# Patient Record
Sex: MALE | Race: WHITE | Employment: OTHER | URBAN - METROPOLITAN AREA
[De-identification: names, ages, dates, MRNs, and addresses within clinical notes are randomized per-mention and may not be internally consistent; named-entity substitution may affect disease eponyms.]

---

## 2021-09-10 ENCOUNTER — HOSPITAL ENCOUNTER (OUTPATIENT)
Age: 51
Discharge: HOME | End: 2021-09-10
Payer: COMMERCIAL

## 2021-09-10 LAB
PARATHYROID HORMONE INTACT: 92.4 PG/ML (ref 14–72)
VITAMIN D 25-HYDROXY: 32.8 NG/ML

## 2021-09-10 PROCEDURE — 82306 VITAMIN D 25 HYDROXY: CPT

## 2021-09-10 PROCEDURE — 83970 ASSAY OF PARATHORMONE: CPT

## 2021-09-10 PROCEDURE — 36415 COLL VENOUS BLD VENIPUNCTURE: CPT

## 2021-11-16 ENCOUNTER — OFFICE VISIT (OUTPATIENT)
Dept: ENDOCRINOLOGY | Age: 51
End: 2021-11-16
Payer: COMMERCIAL

## 2021-11-16 VITALS
HEART RATE: 95 BPM | BODY MASS INDEX: 27.29 KG/M2 | DIASTOLIC BLOOD PRESSURE: 84 MMHG | WEIGHT: 201.2 LBS | SYSTOLIC BLOOD PRESSURE: 120 MMHG

## 2021-11-16 DIAGNOSIS — K21.9 GASTROESOPHAGEAL REFLUX DISEASE, UNSPECIFIED WHETHER ESOPHAGITIS PRESENT: ICD-10-CM

## 2021-11-16 DIAGNOSIS — J45.20 MILD INTERMITTENT ASTHMA WITHOUT COMPLICATION: ICD-10-CM

## 2021-11-16 DIAGNOSIS — E21.3 HYPERPARATHYROIDISM (HCC): Primary | ICD-10-CM

## 2021-11-16 DIAGNOSIS — J30.2 SEASONAL ALLERGIES: ICD-10-CM

## 2021-11-16 PROCEDURE — G8419 CALC BMI OUT NRM PARAM NOF/U: HCPCS | Performed by: INTERNAL MEDICINE

## 2021-11-16 PROCEDURE — G8484 FLU IMMUNIZE NO ADMIN: HCPCS | Performed by: INTERNAL MEDICINE

## 2021-11-16 PROCEDURE — G8427 DOCREV CUR MEDS BY ELIG CLIN: HCPCS | Performed by: INTERNAL MEDICINE

## 2021-11-16 PROCEDURE — 99243 OFF/OP CNSLTJ NEW/EST LOW 30: CPT | Performed by: INTERNAL MEDICINE

## 2021-11-16 NOTE — PROGRESS NOTES
Karolyn Pack is referred to me by Jeanette Worley for Hypercalcemia noted on routine labs. Valencia Horta was diagnosed with hypercalcemia at his routine screening in 2021. Patient doesn't have any history of kidney stones he denies any hematuria . In  had normal calcium 9.3. In 2021 he had PTH level of 92 and Vit D 32 . Patient feels most of his symptoms are due to hypercalcemia and would be interested in surgery. Gerardo Montes complains of   nonspecific symptoms, such as constipation, fatigue, and depression often associated with hypercalcemia . Pt also c/o muscle weakness, and changes in sensorium. He does feel he has cognitive dysfunction more prominent recently. Patient denies any family history of hypercalcemia or MEN syndrome. He has severe constipation for years bit it has gotten worse in the past few years. He has GERD which has been severe and he has been taking otc medicine, he does not recall the names but he also occasionally takes Tums he was advised to stop taking Tums. Patient does have asthma and is on inhalers. Chief Complaint   Patient presents with    New Patient    Thyroid Problem     hyperparathyroidism         Past Medical History:   Diagnosis Date    Allergic rhinitis     Asthma     Hay fever          Past Surgical History:   Procedure Laterality Date    TONSILLECTOMY      WRIST SURGERY         History reviewed. No pertinent family history.     Social History     Socioeconomic History    Marital status: Single     Spouse name: Not on file    Number of children: Not on file    Years of education: Not on file    Highest education level: Not on file   Occupational History    Not on file   Tobacco Use    Smoking status: Former Smoker     Quit date: 2012     Years since quittin.4    Smokeless tobacco: Never Used   Substance and Sexual Activity    Alcohol use: No     Comment: occasionally     Drug use: Not on file    Sexual activity: Not on file   Other Topics Concern    Not on file   Social History Narrative    Not on file     Social Determinants of Health     Financial Resource Strain:     Difficulty of Paying Living Expenses: Not on file   Food Insecurity:     Worried About Running Out of Food in the Last Year: Not on file    Alisha of Food in the Last Year: Not on file   Transportation Needs:     Lack of Transportation (Medical): Not on file    Lack of Transportation (Non-Medical):  Not on file   Physical Activity:     Days of Exercise per Week: Not on file    Minutes of Exercise per Session: Not on file   Stress:     Feeling of Stress : Not on file   Social Connections:     Frequency of Communication with Friends and Family: Not on file    Frequency of Social Gatherings with Friends and Family: Not on file    Attends Bahai Services: Not on file    Active Member of [address] [place] [address] [employer] or Organizations: Not on file    Attends Club or Organization Meetings: Not on file    Marital Status: Not on file   Intimate Partner Violence:     Fear of Current or Ex-Partner: Not on file    Emotionally Abused: Not on file    Physically Abused: Not on file    Sexually Abused: Not on file   Housing Stability:     Unable to Pay for Housing in the Last Year: Not on file    Number of [place] in the Last Year: Not on file    Unstable Housing in the Last Year: Not on file       /84   Pulse 95   Wt 201 lb 3.2 oz (91.3 kg)   BMI 27.29 kg/m²       Chemistry    No results found for: NA, K, CL, CO2, BUN, CREATININE No results found for: CALCIUM, ALKPHOS, AST, ALT, BILITOT            PHYSICAL EXAM   Vitals:    11/16/21 0844   BP: 120/84   Pulse: 95       Constitutional: no acute distress, well appearing and well nourished  Psychiatric: oriented to person, place and time, judgement and insight and normal, recent and remote memory and intact and mood and affect are normal  Skin: skin and subcutaneous tissue and normal without rashes or lesions and palpation of skin and subcutaneous tissue is normal without mass, normal turgor  Head and Face: examination of head and face revealed no abnormalities  Eyes: no lid or conjunctival swelling, erythema or discharge and pupils are normal,   Ears/Nose: external inspection of ears and nose revealed no abnormalities and hearing is grossly normal  Oropharynx/Mouth/Face: lips, tongue and gums are normal with no lesions and the voice quality was normal  Neck: neck is supple and symmetric, with midline trachea and no masses and thyroid is normal with no enlargement or masses  Lymphatics: normal cervical lymph nodes, normal supraclavicular nodes  Pulmonary: no increased work of breathing or signs of respiratory distress and lungs are clear to auscultation  Cardiovascular: normal heart rate and rhythm, normal S1 and S2,   Musculoskeletal: normal gait and station   Neurological Coordination: normal coordination and General Cortical Function: normal    ROS   I have reviewed the review of system questionnaire filled by the patient . Patient was advised to contact PCP for non endocrine signs and symptoms       ASSESSMENT/PLAN     1. Hyperparathyroidism [facility])  Patient is noted to have a vitamin D of 32 in September 2021 with a concomitant PTH level of 92.4, there was no concomitant calcium done with this. I am awaiting the previous calcium levels which were done at the primary care office but the results were not sent to me. I advised patient to avoid factors that can aggravate hypercalcemia including thiazide diuretic and lithium carbonate therapy, volume depletion, prolonged bed rest or inactivity, and a high-calcium diet (>1000 mg/day). ?Encourage physical activity to minimize bone resorption. ? Encourage adequate hydration (at least six to eight glasses of water per day) to minimize the risk of nephrolithiasis. ?Maintain a moderate calcium intake (1000 mg/day)  ? Maintain moderate vitamin D intake (400 to 800 international units daily) to maintain a serum 25-hydroxyvitamin D (25[OH]D) level of at least 20 or 30 ng/mL (50 or 75 mmol/L). Vitamin D deficiency stimulates PTH secretion and bone resorption and, therefore, is deleterious in patients with PHPT. I have ordered the following labs patient will call back once he gets these and if he has a localizing parathyroid adenoma he will be referred for surgery otherwise we will medically manage him. Patient is inclined towards getting surgery done. - PTH, Intact; Future  - Phosphorus; Future  - Vitamin D 25 Hydroxy; Future  - Vitamin D 1,25 Dihydroxy; Future  - DEXA BONE DENSITY AXIAL SKELETON; Future  - Calcium, 24 HR Urine; Future  - Comprehensive Metabolic Panel; Future  - NM PARATHYROID SCAN; Future  - TSH without Reflex; Future  - T4, Free; Future    2. Mild intermittent asthma without complication  Patient is on inhalers stable    3. Seasonal allergies  Stable    4. Gastroesophageal reflux disease, unspecified whether esophagitis present  Patient takes over-the-counter medication, he cannot recall the name of it  He also takes occasionally Tums he was advised to avoid that while we are doing work-up for hypercalcemia he can take medications like Zantac or Nexium. Please note that some or all of this report was generated using voice recognition software. Please notify me in case of any questions about the content of this document, as some errors in transcription may have occurred .

## 2021-11-17 ENCOUNTER — TELEPHONE (OUTPATIENT)
Dept: ENDOCRINOLOGY | Age: 51
End: 2021-11-17

## 2021-11-17 NOTE — TELEPHONE ENCOUNTER
Fax from [facility] Source of [address] [place] [address] Dr regarding the office note from 8/17/21

## 2021-11-17 NOTE — TELEPHONE ENCOUNTER
Called [address]  to have the office refax the pt's most recent labs    Waiting for the labs to be faxed

## 2021-11-18 ENCOUNTER — HOSPITAL ENCOUNTER (OUTPATIENT)
Age: 51
Discharge: HOME | End: 2021-11-18
Payer: COMMERCIAL

## 2021-11-18 DIAGNOSIS — E21.3 HYPERPARATHYROIDISM (HCC): ICD-10-CM

## 2021-11-18 LAB
24HR URINE VOLUME (ML): 2200 ML
CALCIUM 24 HOUR URINE: 548 MG/24 HR (ref 42–353)
CREATININE 24 HOUR URINE: 2 G/24HR (ref 0.6–2.5)

## 2021-11-18 PROCEDURE — 82340 ASSAY OF CALCIUM IN URINE: CPT

## 2021-11-26 ENCOUNTER — HOSPITAL ENCOUNTER (OUTPATIENT)
Dept: GENERAL RADIOLOGY | Age: 51
Discharge: HOME | End: 2021-11-26
Payer: COMMERCIAL

## 2021-11-26 ENCOUNTER — HOSPITAL ENCOUNTER (OUTPATIENT)
Dept: NUCLEAR MEDICINE | Age: 51
Discharge: HOME | End: 2021-11-26
Payer: COMMERCIAL

## 2021-11-26 ENCOUNTER — HOSPITAL ENCOUNTER (OUTPATIENT)
Age: 51
Discharge: HOME | End: 2021-11-26
Payer: COMMERCIAL

## 2021-11-26 DIAGNOSIS — E21.3 HYPERPARATHYROIDISM (HCC): ICD-10-CM

## 2021-11-26 PROCEDURE — 77080 DXA BONE DENSITY AXIAL: CPT

## 2021-11-26 PROCEDURE — 3430000000 HC RX DIAGNOSTIC RADIOPHARMACEUTICAL: Performed by: INTERNAL MEDICINE

## 2021-11-26 PROCEDURE — 78072 PARATHYRD PLANAR W/SPECT&CT: CPT

## 2021-11-26 PROCEDURE — A9500 TC99M SESTAMIBI: HCPCS | Performed by: INTERNAL MEDICINE

## 2021-11-26 RX ADMIN — Medication 27 MILLICURIE: at 10:50

## 2021-12-02 ENCOUNTER — HOSPITAL ENCOUNTER (OUTPATIENT)
Age: 51
Discharge: HOME | End: 2021-12-02
Payer: COMMERCIAL

## 2021-12-02 DIAGNOSIS — E21.3 HYPERPARATHYROIDISM (HCC): ICD-10-CM

## 2021-12-02 LAB
A/G RATIO: 1.4 (ref 1.1–2.2)
ALBUMIN SERPL-MCNC: 4.4 G/DL (ref 3.4–5)
ALP BLD-CCNC: 58 U/L (ref 40–129)
ALT SERPL-CCNC: 58 U/L (ref 10–40)
ANION GAP SERPL CALCULATED.3IONS-SCNC: 14 MMOL/L (ref 3–16)
AST SERPL-CCNC: 36 U/L (ref 15–37)
BILIRUB SERPL-MCNC: 0.4 MG/DL (ref 0–1)
BUN BLDV-MCNC: 13 MG/DL (ref 7–20)
CALCIUM SERPL-MCNC: 10.9 MG/DL (ref 8.3–10.6)
CHLORIDE BLD-SCNC: 101 MMOL/L (ref 99–110)
CO2: 24 MMOL/L (ref 21–32)
CREAT SERPL-MCNC: 1.1 MG/DL (ref 0.9–1.3)
GFR AFRICAN AMERICAN: >60
GFR NON-AFRICAN AMERICAN: >60
GLUCOSE BLD-MCNC: 92 MG/DL (ref 70–99)
PARATHYROID HORMONE INTACT: 89.5 PG/ML (ref 14–72)
PHOSPHORUS: 2.5 MG/DL (ref 2.5–4.9)
POTASSIUM SERPL-SCNC: 4.6 MMOL/L (ref 3.5–5.1)
SODIUM BLD-SCNC: 139 MMOL/L (ref 136–145)
T4 FREE: 1.1 NG/DL (ref 0.9–1.8)
TOTAL PROTEIN: 7.6 G/DL (ref 6.4–8.2)
TSH SERPL DL<=0.05 MIU/L-ACNC: 2.5 UIU/ML (ref 0.27–4.2)
VITAMIN D 25-HYDROXY: 34.4 NG/ML

## 2021-12-02 PROCEDURE — 84100 ASSAY OF PHOSPHORUS: CPT

## 2021-12-02 PROCEDURE — 83970 ASSAY OF PARATHORMONE: CPT

## 2021-12-02 PROCEDURE — 84443 ASSAY THYROID STIM HORMONE: CPT

## 2021-12-02 PROCEDURE — 84439 ASSAY OF FREE THYROXINE: CPT

## 2021-12-02 PROCEDURE — 82652 VIT D 1 25-DIHYDROXY: CPT

## 2021-12-02 PROCEDURE — 80053 COMPREHEN METABOLIC PANEL: CPT

## 2021-12-02 PROCEDURE — 36415 COLL VENOUS BLD VENIPUNCTURE: CPT

## 2021-12-02 PROCEDURE — 82306 VITAMIN D 25 HYDROXY: CPT

## 2021-12-05 LAB — VITAMIN D 1,25-DIHYDROXY: 68.8 PG/ML (ref 19.9–79.3)

## 2021-12-08 DIAGNOSIS — E21.3 HYPERPARATHYROIDISM (HCC): Primary | ICD-10-CM

## 2021-12-13 ENCOUNTER — HOSPITAL ENCOUNTER (OUTPATIENT)
Dept: ULTRASOUND IMAGING | Age: 51
Discharge: HOME | End: 2021-12-13
Payer: COMMERCIAL

## 2021-12-13 ENCOUNTER — OFFICE VISIT (OUTPATIENT)
Dept: ENT CLINIC | Age: 51
End: 2021-12-13
Payer: COMMERCIAL

## 2021-12-13 VITALS — TEMPERATURE: 97.5 F | HEART RATE: 77 BPM | SYSTOLIC BLOOD PRESSURE: 130 MMHG | DIASTOLIC BLOOD PRESSURE: 91 MMHG

## 2021-12-13 DIAGNOSIS — H90.11 CONDUCTIVE HEARING LOSS OF RIGHT EAR WITH UNRESTRICTED HEARING OF LEFT EAR: ICD-10-CM

## 2021-12-13 DIAGNOSIS — E21.0 HYPERPARATHYROIDISM, PRIMARY (HCC): ICD-10-CM

## 2021-12-13 DIAGNOSIS — D35.1 PARATHYROID ADENOMA: ICD-10-CM

## 2021-12-13 DIAGNOSIS — E21.0 HYPERPARATHYROIDISM, PRIMARY (HCC): Primary | ICD-10-CM

## 2021-12-13 PROCEDURE — G8484 FLU IMMUNIZE NO ADMIN: HCPCS | Performed by: OTOLARYNGOLOGY

## 2021-12-13 PROCEDURE — 99203 OFFICE O/P NEW LOW 30 MIN: CPT | Performed by: OTOLARYNGOLOGY

## 2021-12-13 PROCEDURE — G8427 DOCREV CUR MEDS BY ELIG CLIN: HCPCS | Performed by: OTOLARYNGOLOGY

## 2021-12-13 PROCEDURE — 76536 US EXAM OF HEAD AND NECK: CPT

## 2021-12-13 PROCEDURE — 1036F TOBACCO NON-USER: CPT | Performed by: OTOLARYNGOLOGY

## 2021-12-13 PROCEDURE — 3017F COLORECTAL CA SCREEN DOC REV: CPT | Performed by: OTOLARYNGOLOGY

## 2021-12-13 PROCEDURE — G8419 CALC BMI OUT NRM PARAM NOF/U: HCPCS | Performed by: OTOLARYNGOLOGY

## 2021-12-13 ASSESSMENT — ENCOUNTER SYMPTOMS
RHINORRHEA: 0
VOICE CHANGE: 0
SINUS PAIN: 0
TROUBLE SWALLOWING: 0
SORE THROAT: 0

## 2021-12-13 NOTE — PROGRESS NOTES
[facility]       NEW PATIENT VISIT      PCP:  NEIL Mooney - AR[facility]ELLA      REFERRED BY:   Dr. Jessica Angela  Chief Complaint   Patient presents with    Other     hyperparathyroidism       HISTORY OF PRESENT ILLNESS       Eliane Sanchez is a 46 y.o. male here for evaluation and treatment of hyperparathyroidism. recent sestamibi scan suggested a bilateral parathyroid adenoma. Patient has had hypercalcemia and elevated intact PTH level. Symptoms fatigue, can't think straight insomnia, excessive urination, dehydration. Major heartburn. REVIEW OF SYSTEMS   Review of Systems   Constitutional: Negative for chills, fever and unexpected weight change. HENT: Positive for congestion (off and on due to allergies). Negative for ear discharge, ear pain, hearing loss, rhinorrhea, sinus pain, sore throat, tinnitus, trouble swallowing and voice change. PAST MEDICAL HISTORY    Past Medical History:   Diagnosis Date    Allergic rhinitis     Asthma     Hay fever        Past Surgical History:   Procedure Laterality Date    PARATHYROID GLAND SURGERY Left 12/28/2021    EXCISION OF PARATHYROID ADENOMA  LEFT WITH FROZEN SECTIONS performed by Tonya Woodall MD at [address]:    12/13/21 1258   BP: (!) 130/91   Site: Left Upper Arm   Position: Sitting   Cuff Size: Medium Adult   Pulse: 77   Temp: 97.5 °F (36.4 °C)   TempSrc: Temporal       Physical Exam  Vitals reviewed. Constitutional:       General: He is awake. He is not in acute distress. Appearance: Normal appearance. He is well-developed. He is not ill-appearing or toxic-appearing. HENT:      Head: Normocephalic and atraumatic. Salivary Glands: Right salivary gland is not diffusely enlarged or tender. Left salivary gland is not diffusely enlarged or tender.       Right Ear: Tympanic membrane, ear canal and external ear normal.      Left Ear: Tympanic membrane, ear canal and external ear normal.      Nose: Nose normal. No nasal deformity, septal deviation, mucosal edema, congestion or rhinorrhea. Right Turbinates: Not enlarged. Left Turbinates: Not enlarged. Right Sinus: No maxillary sinus tenderness or frontal sinus tenderness. Left Sinus: No maxillary sinus tenderness or frontal sinus tenderness. Mouth/Throat:      Lips: Pink. No lesions. Mouth: Mucous membranes are moist. No oral lesions. Tongue: No lesions. Palate: No mass and lesions. Pharynx: Oropharynx is clear. Uvula midline. No oropharyngeal exudate or posterior oropharyngeal erythema. Tonsils: No tonsillar exudate or tonsillar abscesses. Neck:      Thyroid: No thyroid mass, thyromegaly or thyroid tenderness. Trachea: No tracheal deviation. Comments: No cervical masses or tenderness. Musculoskeletal:      Cervical back: Normal range of motion and neck supple. Lymphadenopathy:      Cervical: No cervical adenopathy. Neurological:      Mental Status: He is alert. COUNSELING FOR PARATHYROIDECTOMY:    Елена Wilson was advised of the recommended surgery/procedure of exploration and possible excision of parathyroid adenoma, if located. I discussed intraoperative parathyroid hormone level monitoring. Елена Wilson understands this will be done under general anesthesia. The surgical procedure was described. I discussed the incisional scar and possible resultant deformity. I discussed the surgical technique and the usual post operative course. I discussed the possibility of persistent or recurrent hyper parathyroidism and/or hypercalcemia. I advised there is no guarantee of cure, success, or of final results.   I discussed the alternatives of therapy, expected outcome and potential benefits, and the attendant risks and potential complications, including, but not limited to, excessive intraoperative or postoperative bleeding, hemorrhage, infection, numbness of skin, vocal cord paralysis, permanent hoarseness or change in or loss of voice, airway obstruction, injury to surrounding structures or organs, injury to major blood vessels or nerves, excessive scarring, deformity, poor (incomplete or lack of) wound healing, pain, discomfort,  recurrence of cancer, need for further surgery, and risks of anesthesia, including heart attack, cardiac arrest, stroke, blood clots in lower extremity veins, pulmonary embolism, and death. Mesfin Cerna was counseled about the risks of edi COVID-19 during the perioperative period and any recovery window from the procedure. The patient was made aware that edi COVID-19 may worsen the prognosis for recovering from the procedure and lend to a higher morbidity and/or mortality risk. All material risks, benefits, and reasonable alternatives including postponing the procedure were discussed. All of Satish's questions were answered. Mesfin Cerna then stated, expressed, and confirmed understanding and acceptance of the preceding, lack of further questions and the desire to proceed with surgery. REVIEW OF IMAGING         Narrative   EXAMINATION:   NUCLEAR MEDICINE PARATHYROID SCINTIGRAPHY WITH SPECT/CT.  11/26/2021     FINDINGS:   Immediate images:       Activity is seen within bilateral thyroid lobes.  Physiologic activity is   seen within salivary glands.       Delayed images:       There is incomplete washout of activity from bilateral thyroid lobes.  Focal   nodular retention of activity is seen at the inferior thyroid beds   bilaterally, best appreciated on SPECT MIP and fused images.  This is subtly   seen at the inferior left thyroid lobe on planar imaging.  Physiologic   activity is seen within salivary glands and heart.       Scattered ground-glass opacity bilaterally, likely atelectasis.  Calcified   anterior right upper lobe pulmonary nodule, compatible with granuloma. Calcified lymph node at the right hilum, in keeping with granulomatous   disease.           Impression   There is bilateral nodular retention of activity at the inferior thyroid bed   bilaterally for which parathyroid adenomas are possible.  Confirmation with   ultrasound would be recommended prior to any planned surgical intervention.             IMPRESSION / Lisa Moon / Santos Lockhart was seen today for other. Diagnoses and all orders for this visit:    Hyperparathyroidism, primary ([address]  -     US THYROID    Parathyroid adenoma  -     US THYROID    Conductive hearing loss of right ear with unrestricted hearing of left ear         RECOMMENDATIONS/PLAN      1. Thyroid ultrasound to evaluate for parathyroid adenoma, call patient with findings. 2. Exploration and possible excision of bilateral parathyroid adenoma(s). 3. Return for post op check.

## 2021-12-23 RX ORDER — BUDESONIDE AND FORMOTEROL FUMARATE DIHYDRATE 160; 4.5 UG/1; UG/1
2 AEROSOL RESPIRATORY (INHALATION) 2 TIMES DAILY
COMMUNITY

## 2021-12-23 NOTE — PROGRESS NOTES
Name_______________________________________Printed:____________________  Date and time of surgery____12/28/2021  1045____________________Arrival Time:____0915   [facility]____________   1. The instructions given regarding when and if a patient needs to stop oral intake prior to surgery varies. Follow the specific instructions you were given                  _X__Nothing to eat or to drink after Midnight the night before.                   ____Carbo loading or ERAS instructions will be given to select patients-if you have been given those instructions -please do the following                           The evening before your surgery after dinner before midnight drink 40 ounces of gatorade. If you are diabetic use sugar free. The morning of surgery drink 40 ounces of water. This needs to be finished 3 hours prior to your surgery start time. 2. Take the following pills with a small sip of water on the morning of surgery____USE YOUR INHALER AND BRING WITH YOU_______________________________________________                  Do not take blood pressure medications ending in pril or sartan the haile prior to surgery or the morning of surgery_   3. Aspirin, Ibuprofen, Advil, Naproxen, Vitamin E and other Anti-inflammatory products and supplements should be stopped for 5 -7days before surgery or as directed by your physician. 4. Check with your Doctor regarding stopping Plavix, Coumadin,Eliquis, Lovenox,Effient,Pradaxa,Xarelto, Fragmin or other blood thinners and follow their instructions. 5. Do not smoke, and do not drink any alcoholic beverages 24 hours prior to surgery. This includes NA Beer. Refrain from the usage of any recreational drugs. 6. You may brush your teeth and gargle the morning of surgery. DO NOT SWALLOW WATER   7. You MUST make arrangements for a responsible adult to stay on site while you are here and take you home after your surgery. You will not be allowed to leave alone or drive yourself home.   It is strongly suggested someone stay with you the first 24 hrs. Your surgery will be cancelled if you do not have a ride home. 8. A parent/legal guardian must accompany a child scheduled for surgery and plan to stay at the hospital until the child is discharged. Please do not bring other children with you. 9. Please wear simple, loose fitting clothing to the hospital.  Melissa Skeans not bring valuables (money, credit cards, checkbooks, etc.) Do not wear any makeup (including no eye makeup) or nail polish on your fingers or toes. 10. DO NOT wear any jewelry or piercings on day of surgery. All body piercing jewelry must be removed. 11. If you have ___dentures, they will be removed before going to the OR; we will provide you a container. If you wear ___contact lenses or _X__glasses, they will be removed; please bring a case for them. 12. Please see your family doctor/pediatrician for a history & physical and/or concerning medications. Bring any test results/reports from your physician's office. PCP__________________Phone___________H&P Appt. Date________             13 If you  have a Living Will and Durable Power of  for Healthcare, please bring in a copy. 15. Notify your Surgeon if you develop any illness between now and surgery  time, cough, cold, fever, sore throat, nausea, vomiting, etc.  Please notify your surgeon if you experience dizziness, shortness of breath or blurred vision between now & the time of your surgery             15.X  DO NOT shave your operative site 96 hours prior to surgery. For face & neck surgery, men may use an electric razor 48 hours prior to surgery. 16. Shower the night before or morning of surgery using an antibacterial soap or as you have been instructed. 17. To provide excellent care visitors will be limited to one in the room at any given time. 18.  Please bring picture ID and insurance card. 19.  Visit our web site for additional information:  [email or web address]              20.During flu season no children under the age of 15 are permitted in the hospital for the safety of all patients. 21. If you take a long acting insulin in the evening only  take half of your usual  dose the night  before your procedure              22. If you use a c-pap please bring DOS if staying overnight,             23.For your convenience [facility] has a pharmacy on site to fill your prescriptions. 24. If you use oxygen and have a portable tank please bring it  with you the DOS             25. Bring a complete list of all your medications with name and dose include any supplements. 26. Other__________________________________________   *Please call pre admission testing if you any further questions   Romeo Arreguin   [address] [place] [address]. [place]  [phone]   [facility] [place] [address] AMELIE DARDEN   [phone]           COVID TESTING    __X_ Covid test to be done 3-5 days prior to scheduled surgery -patient aware they are REQUIRED to bring a copy of the negative result DOS-if they receive a positive result to notify their surgeon         If known - indicate where patient is getting covid test done ___________________________________________________________    ___ Rapid - DOS    ___ Other___________________________________      Myles Mccray POLICY(subject to change)    There is a one visitor policy at [facility] for all surgeries and endoscopies. Whether the visitor can stay or will be asked to wait in the car will depend on the current policy and if social distancing can be maintained. The policy is subject to change at any time. Please make sure the visitor has a cell phone that is on,charged and able to accept calls, as this may be the way that the staff communicates with them. Pain management is NO VISITOR policyThe patients ride is expected to remain in the car

## 2021-12-28 ENCOUNTER — ANESTHESIA EVENT (OUTPATIENT)
Dept: OPERATING ROOM | Age: 51
End: 2021-12-28
Payer: COMMERCIAL

## 2021-12-28 ENCOUNTER — ANESTHESIA (OUTPATIENT)
Dept: OPERATING ROOM | Age: 51
End: 2021-12-28
Payer: COMMERCIAL

## 2021-12-28 ENCOUNTER — HOSPITAL ENCOUNTER (OUTPATIENT)
Age: 51
Setting detail: OUTPATIENT SURGERY
Discharge: HOME | End: 2021-12-28
Attending: OTOLARYNGOLOGY | Admitting: OTOLARYNGOLOGY
Payer: COMMERCIAL

## 2021-12-28 VITALS
DIASTOLIC BLOOD PRESSURE: 85 MMHG | RESPIRATION RATE: 20 BRPM | OXYGEN SATURATION: 100 % | TEMPERATURE: 97 F | SYSTOLIC BLOOD PRESSURE: 130 MMHG

## 2021-12-28 VITALS
SYSTOLIC BLOOD PRESSURE: 128 MMHG | RESPIRATION RATE: 16 BRPM | DIASTOLIC BLOOD PRESSURE: 83 MMHG | OXYGEN SATURATION: 98 % | WEIGHT: 196 LBS | TEMPERATURE: 97.6 F | BODY MASS INDEX: 26.55 KG/M2 | HEART RATE: 88 BPM | HEIGHT: 72 IN

## 2021-12-28 DIAGNOSIS — G89.18 POSTOPERATIVE PAIN: ICD-10-CM

## 2021-12-28 DIAGNOSIS — D35.1 PARATHYROID ADENOMA: Primary | Chronic | ICD-10-CM

## 2021-12-28 PROBLEM — E21.3 HYPERPARATHYROIDISM (HCC): Chronic | Status: ACTIVE | Noted: 2021-12-28

## 2021-12-28 LAB
PARATHYROID HORMONE INTACT: 17.9 PG/ML (ref 14–72)
PARATHYROID HORMONE INTACT: 95.8 PG/ML (ref 14–72)

## 2021-12-28 PROCEDURE — 88305 TISSUE EXAM BY PATHOLOGIST: CPT

## 2021-12-28 PROCEDURE — 2580000003 HC RX 258: Performed by: OTOLARYNGOLOGY

## 2021-12-28 PROCEDURE — 3700000000 HC ANESTHESIA ATTENDED CARE: Performed by: OTOLARYNGOLOGY

## 2021-12-28 PROCEDURE — 7100000000 HC PACU RECOVERY - FIRST 15 MIN: Performed by: OTOLARYNGOLOGY

## 2021-12-28 PROCEDURE — 3600000014 HC SURGERY LEVEL 4 ADDTL 15MIN: Performed by: OTOLARYNGOLOGY

## 2021-12-28 PROCEDURE — 88331 PATH CONSLTJ SURG 1 BLK 1SPC: CPT

## 2021-12-28 PROCEDURE — 7100000010 HC PHASE II RECOVERY - FIRST 15 MIN: Performed by: OTOLARYNGOLOGY

## 2021-12-28 PROCEDURE — 60500 EXPLORE PARATHYROID GLANDS: CPT | Performed by: OTOLARYNGOLOGY

## 2021-12-28 PROCEDURE — 3600000004 HC SURGERY LEVEL 4 BASE: Performed by: OTOLARYNGOLOGY

## 2021-12-28 PROCEDURE — 6360000002 HC RX W HCPCS: Performed by: NURSE ANESTHETIST, CERTIFIED REGISTERED

## 2021-12-28 PROCEDURE — 7100000001 HC PACU RECOVERY - ADDTL 15 MIN: Performed by: OTOLARYNGOLOGY

## 2021-12-28 PROCEDURE — 83970 ASSAY OF PARATHORMONE: CPT

## 2021-12-28 PROCEDURE — 3700000001 HC ADD 15 MINUTES (ANESTHESIA): Performed by: OTOLARYNGOLOGY

## 2021-12-28 PROCEDURE — 2500000003 HC RX 250 WO HCPCS: Performed by: NURSE ANESTHETIST, CERTIFIED REGISTERED

## 2021-12-28 PROCEDURE — 2720000010 HC SURG SUPPLY STERILE: Performed by: OTOLARYNGOLOGY

## 2021-12-28 PROCEDURE — 6360000002 HC RX W HCPCS: Performed by: OTOLARYNGOLOGY

## 2021-12-28 PROCEDURE — 6370000000 HC RX 637 (ALT 250 FOR IP): Performed by: ANESTHESIOLOGY

## 2021-12-28 PROCEDURE — 2500000003 HC RX 250 WO HCPCS: Performed by: OTOLARYNGOLOGY

## 2021-12-28 PROCEDURE — 7100000011 HC PHASE II RECOVERY - ADDTL 15 MIN: Performed by: OTOLARYNGOLOGY

## 2021-12-28 PROCEDURE — 2709999900 HC NON-CHARGEABLE SUPPLY: Performed by: OTOLARYNGOLOGY

## 2021-12-28 RX ORDER — LIDOCAINE HYDROCHLORIDE AND EPINEPHRINE 10; 10 MG/ML; UG/ML
INJECTION, SOLUTION INFILTRATION; PERINEURAL
Status: COMPLETED | OUTPATIENT
Start: 2021-12-28 | End: 2021-12-28

## 2021-12-28 RX ORDER — PROCHLORPERAZINE EDISYLATE 5 MG/ML
5 INJECTION INTRAMUSCULAR; INTRAVENOUS
Status: DISCONTINUED | OUTPATIENT
Start: 2021-12-28 | End: 2021-12-28 | Stop reason: HOSPADM

## 2021-12-28 RX ORDER — HYDROCODONE BITARTRATE AND ACETAMINOPHEN 5; 325 MG/1; MG/1
1 TABLET ORAL EVERY 4 HOURS PRN
Qty: 36 TABLET | Refills: 0 | Status: SHIPPED | OUTPATIENT
Start: 2021-12-28 | End: 2022-01-03

## 2021-12-28 RX ORDER — SUCCINYLCHOLINE/SOD CL,ISO/PF 200MG/10ML
SYRINGE (ML) INTRAVENOUS PRN
Status: DISCONTINUED | OUTPATIENT
Start: 2021-12-28 | End: 2021-12-28 | Stop reason: SDUPTHER

## 2021-12-28 RX ORDER — DEXAMETHASONE SODIUM PHOSPHATE 4 MG/ML
INJECTION, SOLUTION INTRA-ARTICULAR; INTRALESIONAL; INTRAMUSCULAR; INTRAVENOUS; SOFT TISSUE PRN
Status: DISCONTINUED | OUTPATIENT
Start: 2021-12-28 | End: 2021-12-28 | Stop reason: SDUPTHER

## 2021-12-28 RX ORDER — HYDROMORPHONE HCL 110MG/55ML
0.5 PATIENT CONTROLLED ANALGESIA SYRINGE INTRAVENOUS EVERY 5 MIN PRN
Status: DISCONTINUED | OUTPATIENT
Start: 2021-12-28 | End: 2021-12-28 | Stop reason: HOSPADM

## 2021-12-28 RX ORDER — SODIUM CHLORIDE 9 MG/ML
25 INJECTION, SOLUTION INTRAVENOUS PRN
Status: DISCONTINUED | OUTPATIENT
Start: 2021-12-28 | End: 2021-12-28 | Stop reason: HOSPADM

## 2021-12-28 RX ORDER — HYDRALAZINE HYDROCHLORIDE 20 MG/ML
5 INJECTION INTRAMUSCULAR; INTRAVENOUS EVERY 10 MIN PRN
Status: DISCONTINUED | OUTPATIENT
Start: 2021-12-28 | End: 2021-12-28 | Stop reason: HOSPADM

## 2021-12-28 RX ORDER — LABETALOL HYDROCHLORIDE 5 MG/ML
5 INJECTION, SOLUTION INTRAVENOUS EVERY 10 MIN PRN
Status: DISCONTINUED | OUTPATIENT
Start: 2021-12-28 | End: 2021-12-28 | Stop reason: HOSPADM

## 2021-12-28 RX ORDER — METHYLENE BLUE 10 MG/ML
INJECTION INTRAVENOUS
Status: COMPLETED | OUTPATIENT
Start: 2021-12-28 | End: 2021-12-28

## 2021-12-28 RX ORDER — SODIUM CHLORIDE 0.9 % (FLUSH) 0.9 %
10 SYRINGE (ML) INJECTION PRN
Status: DISCONTINUED | OUTPATIENT
Start: 2021-12-28 | End: 2021-12-28 | Stop reason: HOSPADM

## 2021-12-28 RX ORDER — ONDANSETRON 2 MG/ML
4 INJECTION INTRAMUSCULAR; INTRAVENOUS
Status: DISCONTINUED | OUTPATIENT
Start: 2021-12-28 | End: 2021-12-28 | Stop reason: HOSPADM

## 2021-12-28 RX ORDER — LIDOCAINE HYDROCHLORIDE 10 MG/ML
1 INJECTION, SOLUTION EPIDURAL; INFILTRATION; INTRACAUDAL; PERINEURAL
Status: DISCONTINUED | OUTPATIENT
Start: 2021-12-28 | End: 2021-12-28 | Stop reason: HOSPADM

## 2021-12-28 RX ORDER — DIPHENHYDRAMINE HYDROCHLORIDE 50 MG/ML
INJECTION INTRAMUSCULAR; INTRAVENOUS PRN
Status: DISCONTINUED | OUTPATIENT
Start: 2021-12-28 | End: 2021-12-28 | Stop reason: SDUPTHER

## 2021-12-28 RX ORDER — ONDANSETRON 2 MG/ML
INJECTION INTRAMUSCULAR; INTRAVENOUS PRN
Status: DISCONTINUED | OUTPATIENT
Start: 2021-12-28 | End: 2021-12-28 | Stop reason: SDUPTHER

## 2021-12-28 RX ORDER — SODIUM CHLORIDE, SODIUM LACTATE, POTASSIUM CHLORIDE, CALCIUM CHLORIDE 600; 310; 30; 20 MG/100ML; MG/100ML; MG/100ML; MG/100ML
INJECTION, SOLUTION INTRAVENOUS CONTINUOUS
Status: DISCONTINUED | OUTPATIENT
Start: 2021-12-28 | End: 2021-12-28 | Stop reason: HOSPADM

## 2021-12-28 RX ORDER — PROMETHAZINE HYDROCHLORIDE 25 MG/1
25 TABLET ORAL EVERY 6 HOURS PRN
Qty: 40 TABLET | Refills: 0 | Status: SHIPPED | OUTPATIENT
Start: 2021-12-28

## 2021-12-28 RX ORDER — HYDROCODONE BITARTRATE AND ACETAMINOPHEN 5; 325 MG/1; MG/1
1 TABLET ORAL
Status: COMPLETED | OUTPATIENT
Start: 2021-12-28 | End: 2021-12-28

## 2021-12-28 RX ORDER — FENTANYL CITRATE 50 UG/ML
25 INJECTION, SOLUTION INTRAMUSCULAR; INTRAVENOUS EVERY 5 MIN PRN
Status: DISCONTINUED | OUTPATIENT
Start: 2021-12-28 | End: 2021-12-28 | Stop reason: HOSPADM

## 2021-12-28 RX ORDER — LIDOCAINE HYDROCHLORIDE 20 MG/ML
INJECTION, SOLUTION EPIDURAL; INFILTRATION; INTRACAUDAL; PERINEURAL PRN
Status: DISCONTINUED | OUTPATIENT
Start: 2021-12-28 | End: 2021-12-28 | Stop reason: SDUPTHER

## 2021-12-28 RX ORDER — PROPOFOL 10 MG/ML
INJECTION, EMULSION INTRAVENOUS PRN
Status: DISCONTINUED | OUTPATIENT
Start: 2021-12-28 | End: 2021-12-28 | Stop reason: SDUPTHER

## 2021-12-28 RX ORDER — FENTANYL CITRATE 50 UG/ML
INJECTION, SOLUTION INTRAMUSCULAR; INTRAVENOUS PRN
Status: DISCONTINUED | OUTPATIENT
Start: 2021-12-28 | End: 2021-12-28 | Stop reason: SDUPTHER

## 2021-12-28 RX ORDER — SODIUM CHLORIDE 0.9 % (FLUSH) 0.9 %
10 SYRINGE (ML) INJECTION EVERY 12 HOURS SCHEDULED
Status: DISCONTINUED | OUTPATIENT
Start: 2021-12-28 | End: 2021-12-28 | Stop reason: HOSPADM

## 2021-12-28 RX ADMIN — PHENYLEPHRINE HYDROCHLORIDE 100 MCG: 10 INJECTION INTRAVENOUS at 12:01

## 2021-12-28 RX ADMIN — DIPHENHYDRAMINE HYDROCHLORIDE 12.5 MG: 50 INJECTION, SOLUTION INTRAMUSCULAR; INTRAVENOUS at 11:14

## 2021-12-28 RX ADMIN — SODIUM CHLORIDE, POTASSIUM CHLORIDE, SODIUM LACTATE AND CALCIUM CHLORIDE: 600; 310; 30; 20 INJECTION, SOLUTION INTRAVENOUS at 09:23

## 2021-12-28 RX ADMIN — DEXAMETHASONE SODIUM PHOSPHATE 8 MG: 4 INJECTION, SOLUTION INTRAMUSCULAR; INTRAVENOUS at 11:14

## 2021-12-28 RX ADMIN — HYDROCODONE BITARTRATE AND ACETAMINOPHEN 1 TABLET: 5; 325 TABLET ORAL at 13:47

## 2021-12-28 RX ADMIN — Medication 160 MG: at 11:05

## 2021-12-28 RX ADMIN — LIDOCAINE HYDROCHLORIDE 5 MG: 20 INJECTION, SOLUTION EPIDURAL; INFILTRATION; INTRACAUDAL; PERINEURAL at 10:59

## 2021-12-28 RX ADMIN — FENTANYL CITRATE 25 MCG: 50 INJECTION, SOLUTION INTRAMUSCULAR; INTRAVENOUS at 11:28

## 2021-12-28 RX ADMIN — CEFAZOLIN 2000 MG: 10 INJECTION, POWDER, FOR SOLUTION INTRAVENOUS at 10:52

## 2021-12-28 RX ADMIN — FENTANYL CITRATE 25 MCG: 50 INJECTION, SOLUTION INTRAMUSCULAR; INTRAVENOUS at 12:56

## 2021-12-28 RX ADMIN — LIDOCAINE HYDROCHLORIDE 85 MG: 20 INJECTION, SOLUTION EPIDURAL; INFILTRATION; INTRACAUDAL; PERINEURAL at 11:05

## 2021-12-28 RX ADMIN — PROPOFOL 270 MG: 10 INJECTION, EMULSION INTRAVENOUS at 11:05

## 2021-12-28 RX ADMIN — PROPOFOL 30 MG: 10 INJECTION, EMULSION INTRAVENOUS at 10:59

## 2021-12-28 RX ADMIN — FENTANYL CITRATE 50 MCG: 50 INJECTION, SOLUTION INTRAMUSCULAR; INTRAVENOUS at 11:14

## 2021-12-28 RX ADMIN — PHENYLEPHRINE HYDROCHLORIDE 100 MCG: 10 INJECTION INTRAVENOUS at 12:26

## 2021-12-28 RX ADMIN — SODIUM CHLORIDE, POTASSIUM CHLORIDE, SODIUM LACTATE AND CALCIUM CHLORIDE: 600; 310; 30; 20 INJECTION, SOLUTION INTRAVENOUS at 13:00

## 2021-12-28 RX ADMIN — ONDANSETRON 4 MG: 2 INJECTION INTRAMUSCULAR; INTRAVENOUS at 11:14

## 2021-12-28 ASSESSMENT — PULMONARY FUNCTION TESTS
PIF_VALUE: 29
PIF_VALUE: 17
PIF_VALUE: 16
PIF_VALUE: 21
PIF_VALUE: 21
PIF_VALUE: 20
PIF_VALUE: 21
PIF_VALUE: 20
PIF_VALUE: 21
PIF_VALUE: 23
PIF_VALUE: 15
PIF_VALUE: 21
PIF_VALUE: 1
PIF_VALUE: 23
PIF_VALUE: 21
PIF_VALUE: 17
PIF_VALUE: 20
PIF_VALUE: 17
PIF_VALUE: 21
PIF_VALUE: 18
PIF_VALUE: 20
PIF_VALUE: 21
PIF_VALUE: 15
PIF_VALUE: 22
PIF_VALUE: 21
PIF_VALUE: 15
PIF_VALUE: 22
PIF_VALUE: 22
PIF_VALUE: 20
PIF_VALUE: 21
PIF_VALUE: 19
PIF_VALUE: 17
PIF_VALUE: 21
PIF_VALUE: 20
PIF_VALUE: 20
PIF_VALUE: 21
PIF_VALUE: 20
PIF_VALUE: 22
PIF_VALUE: 17
PIF_VALUE: 20
PIF_VALUE: 17
PIF_VALUE: 20
PIF_VALUE: 22
PIF_VALUE: 20
PIF_VALUE: 22
PIF_VALUE: 21
PIF_VALUE: 20
PIF_VALUE: 20
PIF_VALUE: 21
PIF_VALUE: 19
PIF_VALUE: 21
PIF_VALUE: 17
PIF_VALUE: 20
PIF_VALUE: 6
PIF_VALUE: 20
PIF_VALUE: 21
PIF_VALUE: 20
PIF_VALUE: 22
PIF_VALUE: 21
PIF_VALUE: 20
PIF_VALUE: 22
PIF_VALUE: 20
PIF_VALUE: 1
PIF_VALUE: 20
PIF_VALUE: 15
PIF_VALUE: 20
PIF_VALUE: 3
PIF_VALUE: 20
PIF_VALUE: 22
PIF_VALUE: 21
PIF_VALUE: 22
PIF_VALUE: 20
PIF_VALUE: 22
PIF_VALUE: 20
PIF_VALUE: 20
PIF_VALUE: 17
PIF_VALUE: 21
PIF_VALUE: 3
PIF_VALUE: 22
PIF_VALUE: 20
PIF_VALUE: 17
PIF_VALUE: 22
PIF_VALUE: 23
PIF_VALUE: 20
PIF_VALUE: 21
PIF_VALUE: 1
PIF_VALUE: 20
PIF_VALUE: 1
PIF_VALUE: 6
PIF_VALUE: 20
PIF_VALUE: 20
PIF_VALUE: 2
PIF_VALUE: 29
PIF_VALUE: 21
PIF_VALUE: 2
PIF_VALUE: 20
PIF_VALUE: 18
PIF_VALUE: 20
PIF_VALUE: 20
PIF_VALUE: 21
PIF_VALUE: 20
PIF_VALUE: 20
PIF_VALUE: 22
PIF_VALUE: 22
PIF_VALUE: 18
PIF_VALUE: 2
PIF_VALUE: 19
PIF_VALUE: 20
PIF_VALUE: 20
PIF_VALUE: 18
PIF_VALUE: 20
PIF_VALUE: 21
PIF_VALUE: 17
PIF_VALUE: 21
PIF_VALUE: 15
PIF_VALUE: 3
PIF_VALUE: 2
PIF_VALUE: 1
PIF_VALUE: 21
PIF_VALUE: 16
PIF_VALUE: 21
PIF_VALUE: 20

## 2021-12-28 ASSESSMENT — PAIN - FUNCTIONAL ASSESSMENT
PAIN_FUNCTIONAL_ASSESSMENT: 0-10
PAIN_FUNCTIONAL_ASSESSMENT: 0-10

## 2021-12-28 ASSESSMENT — PAIN DESCRIPTION - ORIENTATION
ORIENTATION: ANTERIOR

## 2021-12-28 ASSESSMENT — PAIN SCALES - GENERAL
PAINLEVEL_OUTOF10: 1

## 2021-12-28 ASSESSMENT — PAIN DESCRIPTION - ONSET
ONSET: ON-GOING
ONSET: GRADUAL

## 2021-12-28 ASSESSMENT — PAIN DESCRIPTION - PAIN TYPE
TYPE: SURGICAL PAIN

## 2021-12-28 ASSESSMENT — PAIN DESCRIPTION - LOCATION
LOCATION: NECK

## 2021-12-28 ASSESSMENT — PAIN DESCRIPTION - PROGRESSION
CLINICAL_PROGRESSION: NOT CHANGED

## 2021-12-28 ASSESSMENT — PAIN DESCRIPTION - DESCRIPTORS
DESCRIPTORS: SORE

## 2021-12-28 ASSESSMENT — ENCOUNTER SYMPTOMS: SHORTNESS OF BREATH: 0

## 2021-12-28 NOTE — PROGRESS NOTES
Pt awake and alert at this time. Pt on RA, and VSS. Pt denies pain and nausea, tolerating PO. Skin warm and drsg dry. Pt meets criteria to be discharged from Phase 1.

## 2021-12-28 NOTE — PROGRESS NOTES
Pt arrived from OR to PACU bay 8. Reported received from [address] [place] [address] staff. Pt  arouses to voice. Surgical incisions dressings in place to  Ant. neck. Pt on 6L simple mask, NSR, and VSS. Will continue to monitor.

## 2021-12-28 NOTE — OP NOTE
Operative Note      Patient: Gail Ba  YOB: 1970  MRN: [id number]    Date of Procedure: 12/28/2021    Pre-Op Diagnosis: E21.0  HYPERPARATHYROIDISM;   D35.1  PARATHYROID ADENOMA    Post-Op Diagnosis: Same       Procedure(s):  EXCISION OF PARATHYROID ADENOMA  LEFT WITH FROZEN SECTIONS    Surgeon(s):  Low Moore MD    Assistant:   First Assistant: Jeison Maria    Anesthesia: General    Estimated Blood Loss (mL): less than 50 (fifty)    Complications: None    Specimens:   ID Type Source Tests Collected by Time Destination   A : A. LEFT PARATHYROID MASS RULE OUT ADENOMA Tissue Tissue SURGICAL PATHOLOGY Low Moore MD 12/28/2021 1151        Implants:  * No implants in log *      Drains: * No LDAs found *    Findings: There was an intact well encapsulated 1 cm mass inferior to the inferior pole of the left thyroid lobe consistent with a parathyroid adenoma. The frozen section pathology study was positive for a hypercellular parathyroid gland, probable parathyroid adenoma. Post excision intact PTH level at 18 minutes post excision was 17.9 decreased from a pre-op level of 95.8 indicating adequate removal of the parathyroid adenoma. Detailed Description of Procedure: The patient was taken to the operating room and placed in the supine position. Adequate and uncomplicated general anesthesia was induced and maintained, by the attending anesthetist, using a NIM EMG OETT. The anesthetist monitored the patient and continued the anesthesia throughout the case. A time-out was held and the patient was identified and the procedure and site and side of procedure confirmed. The patient was then positioned and prepped and draped in a sterile manner. The NIM endotracheal tube electrodes were attached to the NIM electrode box. The presternal NIM electrodes were inserted in the usual arrangement and secured with Steri-Strip tapes.   These electrodes were attached to the [facility] monitor box. The incision was then planned and marked with a marking pen, placing it at equal distance between the cricoid cartilage and the thyroid notch, parallel to the skin tension lines. The incision was then infiltrated with 5 ml of 1% lidocaine with 1:100,000 epinephrine in the subcutaneous plane for hemostasis purpose. Methylene blue dots were then made with an 18-gauge needle to facilitate accurate tissue reapproximation at the time of incision closure. After an appropriate elapse of time, the incision was made with #15 scalpel and carried down through the skin and subcutaneous tissues and platysma muscle. Superior and inferior skin-platysma flaps were then elevated in the usual manner to the usual appropriate extent. Bilateral Gelpi retractors were placed. The pretracheal-prethyroid fascia was then incised and divided vertically along the midline,  the strap muscles. This was then dissected in a sharp and blunt fashion and carried down through the fascia to the level of the thyroid and trachea. The strap muscles were then dissected from the anterior and lateral aspect of the thyroid gland with blunt finger dissection. Strap muscles were then retracted laterally. The left inferior pole was then identified. Dissection at the inferior pole revealed a 1 cm nodule consistent with a parathyroid adenoma, just inferior to the inferior pole. The nodule was dissected circumferentially in all directions to a pedicle. The pedicle was then dissected and divided. This was accomplished using the Harmonic scalpel and a hemostat clamp. Frozen section returned as hypercellular parathyroid tissue. Blood was drawn at approximately 18 minutes after removal of the first parathyroid adenoma for intact PTH. Further exploration of the area revealed no evidence of further parathyroid tissue.   Further dissection at the left inferior pole and at the right inferior pole revealed no other parathyroid nodules. The intact parathyroid hormone level was then reported as 17.9 decreased from 95.8 initial preoperative value. This was felt to indicate adequate removal of the parathyroid adenoma and lack of other parathyroid adenoma. The wound was then irrigated with copious sterile saline. Several minimally oozing bleeding points were controlled with bipolar electrocautery. The recurrent laryngeal nerve was tested within the monitor and was intact. There was no other significant bleeding. After irrigating with copious sterile saline, the wound was approximated using 2-0 chromic inverted buried sutures to approximate the fascia and strap muscles at the midline, leaving the inferior 1.5 cm open for egress of fluids. The platysma-subcutaneous layer was then approximated with interrupted buried sutures of 2-0 chromic. The skin was then approximated with a running subcuticular closure using 4-0 Monocryl. The skin edge closure was accomplished with Dermabond surgical skin adhesive. The NIM electrolytes were then removed from the presternal area. The patient was then awakened, extubated, and taken from the operating room to the PACU, in stable condition, having tolerated this procedure well with no intraoperative complications and an estimated blood loss of less than 50 ml.      Electronically signed by Lefty Levine MD on 12/28/2021 at 1:07 PM

## 2021-12-28 NOTE — ANESTHESIA PRE PROCEDURE
Department of Anesthesiology  Preprocedure Note       Name:  Joe Zhu   Age:  46 y.o.  :  1970                                          MRN:  [id number]         Date:  2021      Surgeon: Matt Cabrera):  Josefina Chen MD    Procedure: Procedure(s):  EXCISION OF PARATHYROID ADENOMA RIGHT AND/OR LEFT WITH FROZEN SECTIONS  POSSIBLE THYROIDECTOMY (31104, [id number])    Medications prior to admission:   Prior to Admission medications    Medication Sig Start Date End Date Taking? Authorizing Provider   budesonide-formoterol (SYMBICORT) 160-4.5 MCG/ACT AERO Inhale 2 puffs into the lungs 2 times daily   Yes Historical Provider, MD   albuterol (PROVENTIL HFA;VENTOLIN HFA) 108 (90 BASE) MCG/ACT inhaler Inhale 2 puffs into the lungs every 6 hours as needed for Wheezing. 14  Yes Godfrey Perry DO       Current medications:    Current Facility-Administered Medications   Medication Dose Route Frequency Provider Last Rate Last Admin    ceFAZolin (ANCEF) 2000 mg in dextrose 5 % 50 mL IVPB  2,000 mg IntraVENous Once Josefina Chen MD           Allergies:     Allergies   Allergen Reactions    Penicillins Other (See Comments)     Had allergy test performed as a child and showed rxn to penicillin and molds       Problem List:    Patient Active Problem List   Diagnosis Code    Asthma J45.909    Seasonal allergies J30.2       Past Medical History:        Diagnosis Date    Allergic rhinitis     Asthma     Hay fever        Past Surgical History:        Procedure Laterality Date    TONSILLECTOMY      WRIST SURGERY         Social History:    Social History     Tobacco Use    Smoking status: Former Smoker     Quit date: 2012     Years since quittin.5    Smokeless tobacco: Never Used   Substance Use Topics    Alcohol use: Yes     Comment: occasionally                                 Counseling given: Not Answered      Vital Signs (Current):   Vitals:    21 1307 21 0904 21 0911   BP: (!) 135/91   Pulse:   61   Resp:   16   Temp:  96.1 °F (35.6 °C)    SpO2:   100%   Weight: 195 lb (88.5 kg) 196 lb (88.9 kg)    Height: 6' (1.829 m) 6' (1.829 m)                                               BP Readings from Last 3 Encounters:   12/28/21 (!) 135/91   12/13/21 (!) 130/91   11/16/21 120/84       NPO Status: Time of last liquid consumption: 1900                        Time of last solid consumption: 1900                        Date of last liquid consumption: 12/27/21                        Date of last solid food consumption: 12/27/21    BMI:   Wt Readings from Last 3 Encounters:   12/28/21 196 lb (88.9 kg)   11/16/21 201 lb 3.2 oz (91.3 kg)   06/25/14 186 lb 6.4 oz (84.6 kg)     Body mass index is 26.58 kg/m². CBC: No results found for: WBC, RBC, HGB, HCT, MCV, RDW, PLT    CMP:   Lab Results   Component Value Date     12/02/2021    K 4.6 12/02/2021     12/02/2021    CO2 24 12/02/2021    BUN 13 12/02/2021    CREATININE 1.1 12/02/2021    GFRAA >60 12/02/2021    AGRATIO 1.4 12/02/2021    LABGLOM >60 12/02/2021    GLUCOSE 92 12/02/2021    PROT 7.6 12/02/2021    CALCIUM 10.9 12/02/2021    BILITOT 0.4 12/02/2021    ALKPHOS 58 12/02/2021    AST 36 12/02/2021    ALT 58 12/02/2021       POC Tests: No results for input(s): POCGLU, POCNA, POCK, POCCL, POCBUN, POCHEMO, POCHCT in the last 72 hours.     Coags: No results found for: PROTIME, INR, APTT    HCG (If Applicable): No results found for: PREGTESTUR, PREGSERUM, HCG, HCGQUANT     ABGs: No results found for: PHART, PO2ART, GEO3HOA, DPG3VOC, BEART, C0YDTPER     Type & Screen (If Applicable):  No results found for: LABABO, LABRH    Drug/Infectious Status (If Applicable):  No results found for: HIV, HEPCAB    COVID-19 Screening (If Applicable): No results found for: COVID19        Anesthesia Evaluation  Patient summary reviewed and Nursing notes reviewed no history of anesthetic complications:   Airway: Mallampati: I  TM distance: >3 FB   Neck ROM: full  Mouth opening: > = 3 FB Dental: normal exam         Pulmonary:   (+) asthma:     (-) shortness of breath                           Cardiovascular:        (-) hypertension and  angina                Neuro/Psych:      (-) CVA           GI/Hepatic/Renal:        (-) GERD and liver disease       Endo/Other:        (-) diabetes mellitus, hypothyroidism               Abdominal:             Vascular:     - PVD. Other Findings:           Anesthesia Plan      general     ASA 2       Induction: intravenous. MIPS: Postoperative opioids intended and Prophylactic antiemetics administered. Anesthetic plan and risks discussed with patient. Use of blood products discussed with patient whom. Plan discussed with CRNA.                   Shaheed Gutierrez MD   12/28/2021

## 2021-12-28 NOTE — PROGRESS NOTES
Discharge instructions review with patient and Brother (jasper). All home medications have been reviewed, pt v/u. Discharge instructions signed. Pt discharged via wheelchair. Pt discharged with 2 RX and all belongings. Brother taking stable pt home.

## 2021-12-28 NOTE — BRIEF OP NOTE
Brief Postoperative Note      Patient: Jose Ramsey  YOB: 1970  MRN: [id number]    Date of Procedure: 12/28/2021    Pre-Op Diagnosis: E21.0  HYPERPARATHYROIDISM;   D35.1  PARATHYROID ADENOMA    Post-Op Diagnosis: Same       Procedure(s):  EXCISION OF PARATHYROID ADENOMA  LEFT WITH FROZEN SECTIONS    Surgeon(s):  Lavinia Nguyen MD    Assistant:  First Assistant: Alonzo Wong; Kenneth Alvarenga    Anesthesia: General    Estimated Blood Loss (mL): less than 50 (fifty)    Complications: None    Specimens:   ID Type Source Tests Collected by Time Destination   A : A. LEFT PARATHYROID MASS RULE OUT ADENOMA Tissue Tissue SURGICAL PATHOLOGY Lavinia Nguyen MD 12/28/2021 1151        Implants:  * No implants in log *      Drains: * No LDAs found *    Findings: There was an intact well encapsulated 1 cm mass inferior to the inferior pole of the left thyroid lobe consistent with a parathyroid adenoma. The frozen section pathology study was positive for a hypercellular parathyroid gland, probable parathyroid adenoma. Post excision intact PTH level at 18 minutes post excision was 17.9 decreased from a pre-op level of 95.8 indicating adequate removal of the parathyroid adenoma.   Surrounding tissues all appeared to be normal.     Electronically signed by Lavinia Nguyen MD on 12/28/2021 at 1:01 PM

## 2021-12-28 NOTE — ANESTHESIA POSTPROCEDURE EVALUATION
Department of Anesthesiology  Postprocedure Note    Patient: Deandra Rinaldi  MRN: [id number]  YOB: 1970  Date of evaluation: 12/28/2021  Time:  1:13 PM     Procedure Summary     Date: 12/28/21 Room / Location: [address]    Anesthesia Start: 1553 Anesthesia Stop: 3230    Procedure: EXCISION OF PARATHYROID ADENOMA  LEFT WITH FROZEN SECTIONS (Left Neck) Diagnosis:       (E21.0  HYPERPARATHYROIDISM; )      (D35.1  PARATHYROID ADENOMA)    Surgeons: Beulah Medina MD Responsible Provider: Tram Mcgrath MD    Anesthesia Type: general ASA Status: 2          Anesthesia Type: general    Salas Phase I:      Salas Phase II:      Last vitals: Reviewed and per EMR flowsheets.        Anesthesia Post Evaluation    Patient location during evaluation: PACU  Patient participation: complete - patient participated  Level of consciousness: awake and alert  Pain score: 2  Airway patency: patent  Nausea & Vomiting: no vomiting  Complications: no  Cardiovascular status: blood pressure returned to baseline  Respiratory status: acceptable  Hydration status: euvolemic  Multimodal analgesia pain management approach

## 2022-01-11 ENCOUNTER — OFFICE VISIT (OUTPATIENT)
Dept: ENT CLINIC | Age: 52
End: 2022-01-11

## 2022-01-11 VITALS — SYSTOLIC BLOOD PRESSURE: 131 MMHG | TEMPERATURE: 97.7 F | DIASTOLIC BLOOD PRESSURE: 86 MMHG | HEART RATE: 97 BPM

## 2022-01-11 DIAGNOSIS — Z09 POSTOP CHECK: Primary | ICD-10-CM

## 2022-01-11 PROCEDURE — 99024 POSTOP FOLLOW-UP VISIT: CPT | Performed by: OTOLARYNGOLOGY

## 2022-01-31 ENCOUNTER — HOSPITAL ENCOUNTER (OUTPATIENT)
Dept: ULTRASOUND IMAGING | Age: 52
Discharge: HOME | End: 2022-01-31
Payer: COMMERCIAL

## 2022-01-31 DIAGNOSIS — N50.811 RIGHT TESTICULAR PAIN: ICD-10-CM

## 2022-01-31 PROCEDURE — 76870 US EXAM SCROTUM: CPT

## 2022-02-17 ENCOUNTER — HOSPITAL ENCOUNTER (OUTPATIENT)
Age: 52
Discharge: HOME | End: 2022-02-17
Payer: COMMERCIAL

## 2022-02-17 ENCOUNTER — HOSPITAL ENCOUNTER (OUTPATIENT)
Dept: GENERAL RADIOLOGY | Age: 52
Discharge: HOME | End: 2022-02-17
Payer: COMMERCIAL

## 2022-02-17 DIAGNOSIS — R10.9 ABDOMINAL PAIN, UNSPECIFIED ABDOMINAL LOCATION: ICD-10-CM

## 2022-02-17 PROCEDURE — 74018 RADEX ABDOMEN 1 VIEW: CPT

## 2022-07-18 ENCOUNTER — TELEPHONE (OUTPATIENT)
Dept: ENDOCRINOLOGY | Age: 52
End: 2022-07-18

## 2022-07-18 DIAGNOSIS — D35.1 PARATHYROID ADENOMA: Primary | ICD-10-CM

## 2025-05-15 ENCOUNTER — HOSPITAL ENCOUNTER (OUTPATIENT)
Dept: GENERAL RADIOLOGY | Age: 55
Discharge: HOME | End: 2025-05-15

## 2025-05-15 ENCOUNTER — TRANSCRIBE ORDERS (OUTPATIENT)
Dept: ADMISSION | Age: 55
End: 2025-05-15

## 2025-05-15 DIAGNOSIS — R22.0 HEAD MASS: ICD-10-CM

## 2025-05-15 DIAGNOSIS — R22.0 HEAD MASS: Primary | ICD-10-CM

## 2025-05-15 PROCEDURE — 70260 X-RAY EXAM OF SKULL: CPT

## (undated) DEVICE — BLADE ES ELASTOMERIC COAT INSUL DURABLE BEND UPTO 90DEG

## (undated) DEVICE — Z DISCONTINUED USE 2272117 DRAPE SURG 3 QTR N INVASIVE 2 LAYR DISP

## (undated) DEVICE — NEEDLE FLTR 19GA L1.5IN WALL THK5UM BRN POLYPR HUB S STL

## (undated) DEVICE — GOWN SIRUS NONREIN XL W/TWL: Brand: MEDLINE INDUSTRIES, INC.

## (undated) DEVICE — CORD,CAUTERY,BIPOLAR,STERILE: Brand: MEDLINE

## (undated) DEVICE — SUTURE CHROMIC GUT SZ 2-0 L27IN ABSRB BRN L26MM SH 1/2 CIR G123H

## (undated) DEVICE — GLOVE ORANGE PI 8   MSG9080

## (undated) DEVICE — HYPODERMIC SAFETY NEEDLE: Brand: MAGELLAN

## (undated) DEVICE — SHEARS ENDOSCP L9CM CRV HARM FOCS +

## (undated) DEVICE — TOWEL,OR,DSP,ST,BLUE,STD,4/PK,20PK/CS: Brand: MEDLINE

## (undated) DEVICE — GAUZE,SPONGE,4"X4",16PLY,XRAY,STRL,LF: Brand: MEDLINE

## (undated) DEVICE — SYRINGE MED 3ML CLR PLAS STD N CTRL LUERLOCK TIP DISP

## (undated) DEVICE — DRAPE,T,LAPARO,TRANS,STERILE: Brand: MEDLINE

## (undated) DEVICE — STAPLER SKIN H3.9MM WIRE DIA0.58MM CRWN 6.9MM 35 STPL ROT

## (undated) DEVICE — ADHESIVE SKIN CLSR 0.7ML TOP DERMBND ADV

## (undated) DEVICE — INTENDED FOR TISSUE SEPARATION, AND OTHER PROCEDURES THAT REQUIRE A SHARP SURGICAL BLADE TO PUNCTURE OR CUT.: Brand: BARD-PARKER ® STAINLESS STEEL BLADES

## (undated) DEVICE — MASC TURNOVER KIT: Brand: MEDLINE INDUSTRIES, INC.

## (undated) DEVICE — SUTURE PERMA-HAND SZ 2-0 L30IN NONABSORBABLE BLK L26MM SH K833H

## (undated) DEVICE — PROBE 8225825 3PK INCREMT STD PRASS ROHS

## (undated) DEVICE — SOLUTION IRRIG 500ML 0.9% SOD CHL USP POUR PLAS BTL

## (undated) DEVICE — SUTURE PERMAHAND SZ 2-0 L12X18IN NONABSORBABLE BLK SILK A185H

## (undated) DEVICE — 3M™ STERI-DRAPE™ INSTRUMENT POUCH 1018: Brand: STERI-DRAPE™

## (undated) DEVICE — SUTURE MCRYL + SZ 4-0 L27IN ABSRB UD L19MM PS-2 3/8 CIR MCP426H

## (undated) DEVICE — MAJOR SET UP PK

## (undated) DEVICE — ENDOTRACH TUBE 8229306 NIM EMG 6MM ROHS: Brand: NIM®